# Patient Record
Sex: FEMALE | Race: BLACK OR AFRICAN AMERICAN | NOT HISPANIC OR LATINO | Employment: UNEMPLOYED | URBAN - METROPOLITAN AREA
[De-identification: names, ages, dates, MRNs, and addresses within clinical notes are randomized per-mention and may not be internally consistent; named-entity substitution may affect disease eponyms.]

---

## 2021-06-11 PROBLEM — I10 HYPERTENSION: Status: ACTIVE | Noted: 2021-06-11

## 2021-06-11 PROBLEM — I21.4 NSTEMI (NON-ST ELEVATED MYOCARDIAL INFARCTION): Status: ACTIVE | Noted: 2021-06-11

## 2021-06-13 PROBLEM — I21.4 NSTEMI (NON-ST ELEVATED MYOCARDIAL INFARCTION): Status: RESOLVED | Noted: 2021-06-11 | Resolved: 2021-06-13

## 2021-07-20 PROBLEM — M89.9 LYTIC BONE LESIONS ON XRAY: Status: ACTIVE | Noted: 2021-07-20

## 2021-07-20 PROBLEM — Z01.818 PREOP EXAMINATION: Status: ACTIVE | Noted: 2021-07-20

## 2021-07-20 PROBLEM — I50.22 CHRONIC SYSTOLIC CONGESTIVE HEART FAILURE: Status: ACTIVE | Noted: 2021-07-20

## 2021-07-20 PROBLEM — M89.9 LYTIC LESION OF BONE ON X-RAY: Status: ACTIVE | Noted: 2021-07-20

## 2021-07-20 PROBLEM — I25.5 ISCHEMIC CARDIOMYOPATHY: Status: ACTIVE | Noted: 2021-07-20

## 2021-07-20 PROBLEM — I25.10 CORONARY ARTERY DISEASE INVOLVING NATIVE CORONARY ARTERY OF NATIVE HEART WITHOUT ANGINA PECTORIS: Status: ACTIVE | Noted: 2021-07-20

## 2021-07-20 PROBLEM — Z01.810 ENCOUNTER FOR PREOPERATIVE ASSESSMENT FOR NONCORONARY CARDIAC SURGERY: Status: ACTIVE | Noted: 2021-07-20

## 2021-07-21 PROBLEM — N63.20 LEFT BREAST MASS: Status: ACTIVE | Noted: 2021-07-21

## 2021-07-22 PROBLEM — G95.20 CORD COMPRESSION: Status: ACTIVE | Noted: 2021-07-22

## 2021-07-23 PROBLEM — E04.1 THYROID NODULE: Status: ACTIVE | Noted: 2021-07-23

## 2021-07-25 PROBLEM — E87.8 ELECTROLYTE ABNORMALITY: Status: ACTIVE | Noted: 2021-07-25

## 2021-07-25 PROBLEM — E87.8 ELECTROLYTE ABNORMALITY: Status: RESOLVED | Noted: 2021-07-25 | Resolved: 2021-07-25

## 2021-07-25 PROBLEM — D72.829 LEUKOCYTOSIS: Status: ACTIVE | Noted: 2021-07-25

## 2021-07-30 PROBLEM — M89.9 LYTIC LESION OF BONE ON X-RAY: Status: RESOLVED | Noted: 2021-07-20 | Resolved: 2021-07-30

## 2021-08-04 PROBLEM — R73.9 HYPERGLYCEMIA: Status: ACTIVE | Noted: 2021-08-04

## 2021-08-05 PROBLEM — D72.829 LEUKOCYTOSIS: Status: RESOLVED | Noted: 2021-07-25 | Resolved: 2021-08-05

## 2022-01-11 PROBLEM — R19.7 DIARRHEA: Status: ACTIVE | Noted: 2022-01-11

## 2022-01-11 PROBLEM — R63.0 POOR APPETITE: Status: ACTIVE | Noted: 2022-01-11

## 2022-01-11 PROBLEM — R42 DIZZINESSES: Status: ACTIVE | Noted: 2022-01-11

## 2022-01-11 PROBLEM — R11.2 NAUSEA AND VOMITING: Status: ACTIVE | Noted: 2022-01-11

## 2022-01-11 PROBLEM — U07.1 COVID-19 VIRUS INFECTION: Status: ACTIVE | Noted: 2022-01-11

## 2022-01-13 DIAGNOSIS — U07.1 COVID-19 VIRUS DETECTED: ICD-10-CM

## 2022-08-16 PROBLEM — M89.8X8 MASS OF SPINE: Status: ACTIVE | Noted: 2022-08-16

## 2022-10-12 PROBLEM — T45.1X5A CHEMOTHERAPY-INDUCED NEUROPATHY: Status: ACTIVE | Noted: 2022-10-12

## 2022-10-12 PROBLEM — G62.0 CHEMOTHERAPY-INDUCED NEUROPATHY: Status: ACTIVE | Noted: 2022-10-12

## 2022-11-28 PROBLEM — E05.90 SUBCLINICAL HYPERTHYROIDISM: Status: ACTIVE | Noted: 2022-11-28

## 2024-02-28 PROBLEM — D70.8 OTHER NEUTROPENIA: Status: ACTIVE | Noted: 2024-02-28

## 2024-03-12 ENCOUNTER — SOCIAL WORK (OUTPATIENT)
Dept: ADMINISTRATIVE | Facility: OTHER | Age: 64
End: 2024-03-12

## 2024-03-12 NOTE — PROGRESS NOTES
Received office visit from pt requesting assistance with a gas card. Provided pt with 1/$20.00 gas card-([phone]) from the [employer] Gas card program to assist with transportation. No other needs were noted at that time. Will continue to follow pt and assist.       Kei Zee LMSW    Ext [phone]/Pager [phone]

## 2024-11-20 ENCOUNTER — OUTPATIENT CASE MANAGEMENT (OUTPATIENT)
Dept: ADMINISTRATIVE | Facility: OTHER | Age: 64
End: 2024-11-20

## 2025-02-13 NOTE — PROGRESS NOTES
Received office visit from pt requesting assistance with a gas card. Provided pt with 1/$20.00 gas card-([phone]) from the [employer] Gas Card fund to assist with transportation. No other needs were noted at that time. Will continue to assist as needed.       Kei Zee LMSW    Ext [phone]/Pager [phone]     PAST MEDICAL HISTORY:  No pertinent past medical history